# Patient Record
(demographics unavailable — no encounter records)

---

## 2024-11-22 NOTE — HISTORY OF PRESENT ILLNESS
[de-identified] : The patient is a 59 year  old R hand dominant female who presents today complaining of R knee pain.   Date of Injury/Onset: 8/1/24 Pain:    At Rest: 3/10  With Activity:  5/10  Mechanism of injury: lunged to get a tennis ball and felt pain This is NOT a Work Related Injury being treated under Worker's Compensation. This is NOT an athletic injury occurring associated with an interscholastic or organized sports team. Quality of symptoms: Dull anterior lateral pain mostly, intermittent medial knee pain, clicking Improves with: rest Worse with: deep squat to standing, lateral movements Prior treatment: none Prior Imaging: none Out of work/sport: currently working School/Sport/Position/Occupation:  ; tennis, gym, power walking, spinning Additional Information: None

## 2024-11-22 NOTE — IMAGING
[de-identified] : The patient is a well appearing 59 year  old female of their stated age. Patient ambulates with a normal gait. Negative straight leg raise bilateral.   Effected Knee: ROM:  0-140 degrees Lachman: Negative Pivot Shift: Negative Anterior Drawer: Negative Posterior Drawer / Sag: Negative Varus Stress 0 degrees: Stable Varus Stress 30 degrees: Stable Valgus Stress 0 degrees: Stable Valgus Stress 30 degrees: Stable Medial Hayden: Negative  Lateral Hayden: POSITIVE  Patella Glide: 2+ Patella Apprehension: Negative Patella Grind: Negative Pes [place] Valgus: Negative Pes Cavus: Negative   Palpation: Medial Joint Line: Nontender  Lateral Joint Line: TENDER  Medial Collateral Ligament: Nontender Lateral Collateral Ligament/PLC: Nontender Distal Femur: Nontender Proximal Tibia: Nontender Tibial Tubercle: Nontender Gerdy's Tubercle: Nontender Distal Pole Patella: Nontender Quadriceps Tendon: Nontender &  Intact Patella Tendon: Nontender &  Intact Medial Femoral Condyle: Nontender Medial Distal Hamstring/PES: Nontender Lateral Distal Hamstring: Nontender & Stable Iliotibial Band: TENDER  Medial Patellofemoral Ligament: Nontender Adductor: Nontender Proximal GSC-Plantaris: Nontender Calf: Supple & Nontender   Inspection: Deformity: No Erythema: No Ecchymosis: No Abrasions: No Effusion: MILD Prepatellar Bursitis: No Neurologic Exam: Sensation L4-S1: Grossly Intact Motor Exam: Quadriceps: 5 out of 5 Hamstrings: 5 out of 5 EHL: 5 out of 5 FHL: 5 out of 5 TA: 5 out of 5 GS: 5 out of 5 Circulatory/Pulses: Dorsalis Pedis: 2+ Posterior Tibialis: 2+ Additional Pertinent Findings: None   Contralateral Knee:                         ROM: 0-145 degrees Other Pertinent Findings: None   Assessment: The patient is a 59 year old female with right knee pain and radiographic and physical exam findings consistent with possible lateral meniscus tear The patient's condition is acute. Documents/Results Reviewed Today: X-Ray right knee Tests/Studies Independently Interpreted Today: X-Ray right knee reveals evidence of well maintained joint space. Pertinent findings include: 0-140, mild effusion, LJLT, +lateral hayden, tender ITB  Confounding medical conditions/concerns: None   Plan: Due to worsening pain and instability with catching/locking mechanical symptoms, patient will obtain MRI right knee to evaluate for possible lateral meniscus tear. In the interim, we reviewed appropriate use of OTC anti-inflammatories as needed for pain, inflammation, and discomfort. In the interim, we recommended the patient avoid any deep squatting, pivoting, and cutting. Modify activity as discussed.  Tests Ordered: MRI right knee Prescription Medications Ordered: Discussed appropriate use of OTC anti-inflammatories and analgesics (including but not limited to Aleve, Advil, Tylenol, Motrin, Ibuprofen, Voltaren gel, etc.) Braces/DME Ordered: None Activity/Work/Sports Status: As tolerated, avoid deep squatting Additional Instructions: None Follow-Up: After MRI   The patient's current medication management of their orthopedic diagnosis was reviewed today: (1) We discussed a comprehensive treatment plan that included possible pharmaceutical management involving the use of prescription strength medications including but not limited to options such as oral Naprosyn 500mg BID, once daily Meloxicam 15 mg, or 500-650 mg Tylenol versus over the counter oral medications and topical prescription NSAID Pennsaid vs over the counter Voltaren gel. Based on our extensive discussion, the patient declined prescription medication and will use over the counter Advil, Alleve, Voltaren Gel or Tylenol as directed. (2) There is a moderate risk of morbidity with further treatment, especially from use of prescription strength medications and possible side effects of these medications which include upset stomach with oral medications, skin reactions to topical medications and cardiac/renal issues with long term use. (3) I recommended that the patient follow-up with their medical physician to discuss any significant specific potential issues with long term medication use such as interactions with current medications or with exacerbation of underlying medical comorbidities. (4) The benefits and risks associated with use of injectable, oral or topical, prescription and over the counter anti-inflammatory medications were discussed with the patient. The patient voiced understanding of the risks including but not limited to bleeding, stroke, kidney dysfunction, heart disease, and were referred to the black box warning label for further information.   Christi MARIE attest that this documentation has been prepared under the direction and in the presence of Provider Dr. Terrell Long.   The documentation recorded by the scribe accurately reflects the services IDr. Terrell, personally performed and the decisions made by me. [FreeTextEntry9] : X-Ray right knee reveals evidence of well maintained joint space.

## 2024-11-22 NOTE — HISTORY OF PRESENT ILLNESS
[de-identified] : The patient is a 59 year  old R hand dominant female who presents today complaining of R knee pain.   Date of Injury/Onset: 8/1/24 Pain:    At Rest: 3/10  With Activity:  5/10  Mechanism of injury: lunged to get a tennis ball and felt pain This is NOT a Work Related Injury being treated under Worker's Compensation. This is NOT an athletic injury occurring associated with an interscholastic or organized sports team. Quality of symptoms: Dull anterior lateral pain mostly, intermittent medial knee pain, clicking Improves with: rest Worse with: deep squat to standing, lateral movements Prior treatment: none Prior Imaging: none Out of work/sport: currently working School/Sport/Position/Occupation:  ; tennis, gym, power walking, spinning Additional Information: None

## 2024-11-22 NOTE — IMAGING
[de-identified] : The patient is a well appearing 59 year  old female of their stated age. Patient ambulates with a normal gait. Negative straight leg raise bilateral.   Effected Knee: ROM:  0-140 degrees Lachman: Negative Pivot Shift: Negative Anterior Drawer: Negative Posterior Drawer / Sag: Negative Varus Stress 0 degrees: Stable Varus Stress 30 degrees: Stable Valgus Stress 0 degrees: Stable Valgus Stress 30 degrees: Stable Medial Hayden: Negative  Lateral Hayden: POSITIVE  Patella Glide: 2+ Patella Apprehension: Negative Patella Grind: Negative Pes [place] Valgus: Negative Pes Cavus: Negative   Palpation: Medial Joint Line: Nontender  Lateral Joint Line: TENDER  Medial Collateral Ligament: Nontender Lateral Collateral Ligament/PLC: Nontender Distal Femur: Nontender Proximal Tibia: Nontender Tibial Tubercle: Nontender Gerdy's Tubercle: Nontender Distal Pole Patella: Nontender Quadriceps Tendon: Nontender &  Intact Patella Tendon: Nontender &  Intact Medial Femoral Condyle: Nontender Medial Distal Hamstring/PES: Nontender Lateral Distal Hamstring: Nontender & Stable Iliotibial Band: TENDER  Medial Patellofemoral Ligament: Nontender Adductor: Nontender Proximal GSC-Plantaris: Nontender Calf: Supple & Nontender   Inspection: Deformity: No Erythema: No Ecchymosis: No Abrasions: No Effusion: MILD Prepatellar Bursitis: No Neurologic Exam: Sensation L4-S1: Grossly Intact Motor Exam: Quadriceps: 5 out of 5 Hamstrings: 5 out of 5 EHL: 5 out of 5 FHL: 5 out of 5 TA: 5 out of 5 GS: 5 out of 5 Circulatory/Pulses: Dorsalis Pedis: 2+ Posterior Tibialis: 2+ Additional Pertinent Findings: None   Contralateral Knee:                         ROM: 0-145 degrees Other Pertinent Findings: None   Assessment: The patient is a 59 year old female with right knee pain and radiographic and physical exam findings consistent with possible lateral meniscus tear The patient's condition is acute. Documents/Results Reviewed Today: X-Ray right knee Tests/Studies Independently Interpreted Today: X-Ray right knee reveals evidence of well maintained joint space. Pertinent findings include: 0-140, mild effusion, LJLT, +lateral hayden, tender ITB  Confounding medical conditions/concerns: None   Plan: Due to worsening pain and instability with catching/locking mechanical symptoms, patient will obtain MRI right knee to evaluate for possible lateral meniscus tear. In the interim, we reviewed appropriate use of OTC anti-inflammatories as needed for pain, inflammation, and discomfort. In the interim, we recommended the patient avoid any deep squatting, pivoting, and cutting. Modify activity as discussed.  Tests Ordered: MRI right knee Prescription Medications Ordered: Discussed appropriate use of OTC anti-inflammatories and analgesics (including but not limited to Aleve, Advil, Tylenol, Motrin, Ibuprofen, Voltaren gel, etc.) Braces/DME Ordered: None Activity/Work/Sports Status: As tolerated, avoid deep squatting Additional Instructions: None Follow-Up: After MRI   The patient's current medication management of their orthopedic diagnosis was reviewed today: (1) We discussed a comprehensive treatment plan that included possible pharmaceutical management involving the use of prescription strength medications including but not limited to options such as oral Naprosyn 500mg BID, once daily Meloxicam 15 mg, or 500-650 mg Tylenol versus over the counter oral medications and topical prescription NSAID Pennsaid vs over the counter Voltaren gel. Based on our extensive discussion, the patient declined prescription medication and will use over the counter Advil, Alleve, Voltaren Gel or Tylenol as directed. (2) There is a moderate risk of morbidity with further treatment, especially from use of prescription strength medications and possible side effects of these medications which include upset stomach with oral medications, skin reactions to topical medications and cardiac/renal issues with long term use. (3) I recommended that the patient follow-up with their medical physician to discuss any significant specific potential issues with long term medication use such as interactions with current medications or with exacerbation of underlying medical comorbidities. (4) The benefits and risks associated with use of injectable, oral or topical, prescription and over the counter anti-inflammatory medications were discussed with the patient. The patient voiced understanding of the risks including but not limited to bleeding, stroke, kidney dysfunction, heart disease, and were referred to the black box warning label for further information.   Christi MARIE attest that this documentation has been prepared under the direction and in the presence of Provider Dr. Terrell Long.   The documentation recorded by the scribe accurately reflects the services IDr. Terrell, personally performed and the decisions made by me. [FreeTextEntry9] : X-Ray right knee reveals evidence of well maintained joint space.

## 2024-12-02 NOTE — HISTORY OF PRESENT ILLNESS
[FreeTextEntry1] : 58 yo p1  here for annual exam had ascus hpv+  denies vb, dc, pal pain.   not sa at present. having knee problems osteopenic, low risk works out not taking supplements, rec billy ,vit d

## 2024-12-13 NOTE — HISTORY OF PRESENT ILLNESS
[de-identified] : The patient is a 59 year  old R hand dominant female who presents today complaining of R knee pain.   Date of Injury/Onset: 8/1/24 Pain:    At Rest: 3/10  With Activity:  5/10  Mechanism of injury: lunged to get a tennis ball and felt pain This is NOT a Work Related Injury being treated under Worker's Compensation. This is NOT an athletic injury occurring associated with an interscholastic or organized sports team. Quality of symptoms: Dull anterior lateral pain mostly, intermittent medial knee pain, clicking Improves with: rest Worse with: deep squat to standing, lateral movements Treatment/Imaging/Studies Since Last Visit: MRI OC 11/27/24 	Reports Available For Review Today: MRI report  Out of work/sport: currently working School/Sport/Position/Occupation:  ; tennis, gym, power walking, spinning Changes since last visit: Here to review MRI results. Feeling a little bit better.  Additional Information: None

## 2024-12-13 NOTE — IMAGING
[de-identified] : The patient is a well appearing 59 year  old female of their stated age. Patient ambulates with a normal gait. Negative straight leg raise bilateral.   Effected Knee: RIGHT  ROM:  0-140 degrees Lachman: Negative Pivot Shift: Negative Anterior Drawer: Negative Posterior Drawer / Sag: Negative Varus Stress 0 degrees: Stable Varus Stress 30 degrees: Stable Valgus Stress 0 degrees: Stable Valgus Stress 30 degrees: Stable Medial Hayden: Negative  Lateral Hayden: Negative  Patella Glide: 2+ Patella Apprehension: Negative Patella Grind: POSITIVE  Pes [place] Valgus: Negative Pes Cavus: Negative   Palpation: Medial Joint Line: Nontender  Lateral Joint Line: Nontender  Medial Collateral Ligament: Nontender Lateral Collateral Ligament/PLC: Nontender Distal Femur: Nontender Proximal Tibia: Nontender Tibial Tubercle: Nontender Gerdy's Tubercle: Nontender Distal Pole Patella: Nontender Quadriceps Tendon: Nontender &  Intact Patella Tendon: Nontender &  Intact Medial Femoral Condyle: Nontender Medial Distal Hamstring/PES: Nontender Lateral Distal Hamstring: Nontender & Stable Iliotibial Band: TENDER  Medial Patellofemoral Ligament: Nontender Adductor: Nontender Proximal GSC-Plantaris: Nontender Calf: Supple & Nontender   Inspection: Deformity: No Erythema: No Ecchymosis: No Abrasions: No Effusion: MILD Prepatellar Bursitis: No Neurologic Exam: Sensation L4-S1: Grossly Intact Motor Exam: Quadriceps: 5 out of 5 Hamstrings: 5 out of 5 EHL: 5 out of 5 FHL: 5 out of 5 TA: 5 out of 5 GS: 5 out of 5 Circulatory/Pulses: Dorsalis Pedis: 2+ Posterior Tibialis: 2+ Additional Pertinent Findings: None   Contralateral Knee:                         ROM: 0-145 degrees Other Pertinent Findings: None   Assessment: The patient is a 59 year old female with right knee pain and radiographic and physical exam findings 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 Documents/Results Reviewed Today: MRI right knee  Tests/Studies Independently Interpreted Today: MRI right knee reveals evidence of patellofemoral degenerative changes with signal about lateral meniscal root, no evidence of root avulsion, no significant effusion, intact cruciate and collateral ligaments. Pertinent findings include: 0-140, mild effusion, -lateral hayden, tender ITB, +patellofemoral grind  Confounding medical conditions/concerns: None   Plan: Overall, the patients lateral knee discomfort has improved since previous visit. Although we expressed concern for a degenerative lateral meniscal root tear, the patient has no significant joint line tenderness nor catching/locking mechanical symptoms. We will continue to monitor, if worsening we will discuss a partial lateral meniscectomy. Patient will start physical therapy, HEP, and stretching. Advised patient to obtain Reparel sleeve. Prescribed patient Naproxen 500mg BID x 2 weeks, then PRN for pain management and inflammation - use as directed and take with food. Modify activity as discussed.  Tests Ordered: None  Prescription Medications Ordered: Naproxen 500mg  Braces/DME Ordered: None Activity/Work/Sports Status: As tolerated Additional Instructions: None Follow-Up: 4 weeks    The patient's current medication management of their orthopedic diagnosis was reviewed today: The patient was prescribed Naprosyn 500mg BID for two weeks and then as needed.  (1) We discussed a comprehensive treatment plan that included possible pharmaceutical management involving the use of prescription strength medications including but not limited to options such as oral Naprosyn 500mg BID, once daily Meloxicam 15 mg, or 500-650 mg Tylenol versus over the counter oral medications and topical prescription NSAID Pennsaid vs over the counter Voltaren gel.  Based on our extensive discussion, the patient was prescribed Naprosyn 500mg BID for two weeks.  It will then be used PRN for pain, inflammation and discomfort. (2) There is a moderate risk of morbidity with further treatment, especially from use of prescription strength medications and possible side effects of these medications which include upset stomach with oral medications, skin reactions to topical medications and cardiac/renal issues with long term use. (3) I recommended that the patient follow-up with their medical physician to discuss any significant specific potential issues with long term medication use such as interactions with current medications or with exacerbation of underlying medical comorbidities. (4) The benefits and risks associated with use of injectable, oral or topical, prescription and over the counter anti-inflammatory medications were discussed with the patient. The patient voiced understanding of the risks including but not limited to bleeding, stroke, kidney dysfunction, heart disease, and were referred to the black box warning label for further information.  I, Christi Peacock attest that this documentation has been prepared under the direction and in the presence of Provider Dr. Terrell Long.   The documentation recorded by the scribe accurately reflects the services I, Dr. Terrell Long, personally performed and the decisions made by me.

## 2024-12-13 NOTE — IMAGING
[de-identified] : The patient is a well appearing 59 year  old female of their stated age. Patient ambulates with a normal gait. Negative straight leg raise bilateral.   Effected Knee: RIGHT  ROM:  0-140 degrees Lachman: Negative Pivot Shift: Negative Anterior Drawer: Negative Posterior Drawer / Sag: Negative Varus Stress 0 degrees: Stable Varus Stress 30 degrees: Stable Valgus Stress 0 degrees: Stable Valgus Stress 30 degrees: Stable Medial [place]: Negative  Lateral Hayden: Negative  Patella Glide: 2+ Patella Apprehension: Negative Patella Grind: POSITIVE  Pes [place] Valgus: Negative Pes Cavus: Negative   Palpation: Medial Joint Line: Nontender  Lateral Joint Line: Nontender  Medial Collateral Ligament: Nontender Lateral Collateral Ligament/PLC: Nontender Distal Femur: Nontender Proximal Tibia: Nontender Tibial Tubercle: Nontender Gerdy's Tubercle: Nontender Distal Pole Patella: Nontender Quadriceps Tendon: Nontender &  Intact Patella Tendon: Nontender &  Intact Medial Femoral Condyle: Nontender Medial Distal Hamstring/PES: Nontender Lateral Distal Hamstring: Nontender & Stable Iliotibial Band: TENDER  Medial Patellofemoral Ligament: Nontender Adductor: Nontender Proximal GSC-Plantaris: Nontender Calf: Supple & Nontender   Inspection: Deformity: No Erythema: No Ecchymosis: No Abrasions: No Effusion: MILD Prepatellar Bursitis: No Neurologic Exam: Sensation L4-S1: Grossly Intact Motor Exam: Quadriceps: 5 out of 5 Hamstrings: 5 out of 5 EHL: 5 out of 5 FHL: 5 out of 5 TA: 5 out of 5 GS: 5 out of 5 Circulatory/Pulses: Dorsalis Pedis: 2+ Posterior Tibialis: 2+ Additional Pertinent Findings: None   Contralateral Knee:                         ROM: 0-145 degrees Other Pertinent Findings: None   Assessment: The patient is a 59 year old female with right knee pain and radiographic and physical exam findings 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 Documents/Results Reviewed Today: MRI right knee  Tests/Studies Independently Interpreted Today: MRI right knee reveals evidence of patellofemoral degenerative changes with signal about lateral meniscal root, no evidence of root avulsion, no significant effusion, intact cruciate and collateral ligaments. Pertinent findings include: 0-140, mild effusion, -lateral hayden, tender ITB, +patellofemoral grind  Confounding medical conditions/concerns: None   Plan: Overall, the patients lateral knee discomfort has improved since previous visit. Although we expressed concern for a degenerative lateral meniscal root tear, the patient has no significant joint line tenderness nor catching/locking mechanical symptoms. We will continue to monitor, if worsening we will discuss a partial lateral meniscectomy. Patient will start physical therapy, HEP, and stretching. Advised patient to obtain Reparel sleeve. Prescribed patient Naproxen 500mg BID x 2 weeks, then PRN for pain management and inflammation - use as directed and take with food. Modify activity as discussed.  Tests Ordered: None  Prescription Medications Ordered: Naproxen 500mg  Braces/DME Ordered: None Activity/Work/Sports Status: As tolerated Additional Instructions: None Follow-Up: 4 weeks    The patient's current medication management of their orthopedic diagnosis was reviewed today: The patient was prescribed Naprosyn 500mg BID for two weeks and then as needed.  (1) We discussed a comprehensive treatment plan that included possible pharmaceutical management involving the use of prescription strength medications including but not limited to options such as oral Naprosyn 500mg BID, once daily Meloxicam 15 mg, or 500-650 mg Tylenol versus over the counter oral medications and topical prescription NSAID Pennsaid vs over the counter Voltaren gel.  Based on our extensive discussion, the patient was prescribed Naprosyn 500mg BID for two weeks.  It will then be used PRN for pain, inflammation and discomfort. (2) There is a moderate risk of morbidity with further treatment, especially from use of prescription strength medications and possible side effects of these medications which include upset stomach with oral medications, skin reactions to topical medications and cardiac/renal issues with long term use. (3) I recommended that the patient follow-up with their medical physician to discuss any significant specific potential issues with long term medication use such as interactions with current medications or with exacerbation of underlying medical comorbidities. (4) The benefits and risks associated with use of injectable, oral or topical, prescription and over the counter anti-inflammatory medications were discussed with the patient. The patient voiced understanding of the risks including but not limited to bleeding, stroke, kidney dysfunction, heart disease, and were referred to the black box warning label for further information.  I, Christi Peacock attest that this documentation has been prepared under the direction and in the presence of Provider Dr. Terrell Long.   The documentation recorded by the scribe accurately reflects the services I, Dr. Terrell Long, personally performed and the decisions made by me.

## 2024-12-13 NOTE — DATA REVIEWED
[MRI] : MRI [Right] : of the right [Knee] : knee [Report was reviewed and noted in the chart] : The report was reviewed and noted in the chart [I independently reviewed and interpreted images and report] : I independently reviewed and interpreted images and report [I reviewed the films/CD and additionally noted] : I reviewed the films/CD and additionally noted [FreeTextEntry1] : MRI right knee reveals evidence of patellofemoral degenerative changes with signal about lateral meniscal root, no evidence of root avulsion, no significant effusion, intact cruciate and collateral ligaments

## 2024-12-13 NOTE — HISTORY OF PRESENT ILLNESS
[de-identified] : The patient is a 59 year  old R hand dominant female who presents today complaining of R knee pain.   Date of Injury/Onset: 8/1/24 Pain:    At Rest: 3/10  With Activity:  5/10  Mechanism of injury: lunged to get a tennis ball and felt pain This is NOT a Work Related Injury being treated under Worker's Compensation. This is NOT an athletic injury occurring associated with an interscholastic or organized sports team. Quality of symptoms: Dull anterior lateral pain mostly, intermittent medial knee pain, clicking Improves with: rest Worse with: deep squat to standing, lateral movements Treatment/Imaging/Studies Since Last Visit: MRI OC 11/27/24 	Reports Available For Review Today: MRI report  Out of work/sport: currently working School/Sport/Position/Occupation:  ; tennis, gym, power walking, spinning Changes since last visit: Here to review MRI results. Feeling a little bit better.  Additional Information: None